# Patient Record
Sex: MALE | Race: OTHER | Employment: FULL TIME | URBAN - METROPOLITAN AREA
[De-identification: names, ages, dates, MRNs, and addresses within clinical notes are randomized per-mention and may not be internally consistent; named-entity substitution may affect disease eponyms.]

---

## 2021-09-28 ENCOUNTER — HOSPITAL ENCOUNTER (EMERGENCY)
Age: 41
Discharge: HOME | End: 2021-09-28
Attending: EMERGENCY MEDICINE

## 2021-09-28 VITALS
BODY MASS INDEX: 27.55 KG/M2 | HEIGHT: 65 IN | RESPIRATION RATE: 16 BRPM | SYSTOLIC BLOOD PRESSURE: 159 MMHG | WEIGHT: 165.34 LBS | OXYGEN SATURATION: 96 % | TEMPERATURE: 97.4 F | DIASTOLIC BLOOD PRESSURE: 95 MMHG | HEART RATE: 73 BPM

## 2021-09-28 DIAGNOSIS — L02.91 ABSCESS: Primary | ICD-10-CM

## 2021-09-28 PROCEDURE — 74011250637 HC RX REV CODE- 250/637: Performed by: NURSE PRACTITIONER

## 2021-09-28 PROCEDURE — 99283 EMERGENCY DEPT VISIT LOW MDM: CPT

## 2021-09-28 RX ORDER — SULFAMETHOXAZOLE AND TRIMETHOPRIM 800; 160 MG/1; MG/1
1 TABLET ORAL
Status: COMPLETED | OUTPATIENT
Start: 2021-09-28 | End: 2021-09-28

## 2021-09-28 RX ORDER — SULFAMETHOXAZOLE AND TRIMETHOPRIM 800; 160 MG/1; MG/1
1 TABLET ORAL 2 TIMES DAILY
Qty: 14 TABLET | Refills: 0 | Status: SHIPPED | OUTPATIENT
Start: 2021-09-28 | End: 2021-10-05

## 2021-09-28 RX ADMIN — SULFAMETHOXAZOLE AND TRIMETHOPRIM 1 TABLET: 800; 160 TABLET ORAL at 23:19

## 2021-09-29 NOTE — ED TRIAGE NOTES
Pt arrives from home with mask in place via POV. He was at work on Friday when he felt what he thought was an ant bite on lower abdomen. Today the area is abscessed, red and swollen.

## 2021-09-29 NOTE — ED PROVIDER NOTES
53-year-old male who presents emergency department today with complaint of wound to his right lower abdomen. Patient reports began last Friday. He states he thought he got bit by an aunt at work so he ignored it. He states that now he believes it might have been a spider. He reports the area has progressively gotten more red. He reports drainage from the area. Patient denies any fever, chills, nausea, vomiting, or diarrhea. He denies any treatment for his symptoms. The history is provided by the patient. A  was used. Abscess   This is a new problem. The current episode started more than 2 days ago. The problem has been gradually worsening. There has been no fever. The rash is present on the trunk. The pain is mild. The pain has been fluctuating since onset. Associated symptoms include pain. Pertinent negatives include no blisters, no itching, no weeping and no hives. He has tried nothing for the symptoms. History reviewed. No pertinent past medical history. History reviewed. No pertinent surgical history. History reviewed. No pertinent family history. Social History     Socioeconomic History    Marital status: Not on file     Spouse name: Not on file    Number of children: Not on file    Years of education: Not on file    Highest education level: Not on file   Occupational History    Not on file   Tobacco Use    Smoking status: Not on file   Substance and Sexual Activity    Alcohol use: Not on file    Drug use: Not on file    Sexual activity: Not on file   Other Topics Concern    Not on file   Social History Narrative    Not on file     Social Determinants of Health     Financial Resource Strain:     Difficulty of Paying Living Expenses:    Food Insecurity:     Worried About Running Out of Food in the Last Year:     [address] in the Last Year:    Transportation Needs:     Lack of Transportation (Medical):      Lack of Transportation (Non-Medical):    Physical Activity:     Days of Exercise per Week:     Minutes of Exercise per Session:    Stress:     Feeling of Stress :    Social Connections:     Frequency of Communication with Friends and Family:     Frequency of Social Gatherings with Friends and Family:     Attends Bahai Services:     Active Member of Clubs or Organizations:     Attends Club or Organization Meetings:     Marital Status:    Intimate Partner Violence:     Fear of Current or Ex-Partner:     Emotionally Abused:     Physically Abused:     Sexually Abused: ALLERGIES: Patient has no known allergies. Review of Systems   Constitutional: Negative for chills and fever. Gastrointestinal: Negative for diarrhea, nausea and vomiting. Skin: Positive for wound. Negative for itching. All other systems reviewed and are negative. Vitals:    09/28/21 2243   BP: (!) 159/95   Pulse: 73   Resp: 16   Temp: 97.4 °F (36.3 °C)   SpO2: 96%   Weight: 75 kg (165 lb 5.5 oz)   Height: 5' 5\" (1.651 m)            Physical Exam  Vitals and nursing note reviewed. Constitutional:       General: He is not in acute distress. Appearance: Normal appearance. He is normal weight. He is not ill-appearing, toxic-appearing or diaphoretic. HENT:      Head: Normocephalic and atraumatic. Right Ear: External ear normal.      Left Ear: External ear normal.      Mouth/Throat:      Mouth: Mucous membranes are moist.      Pharynx: Oropharynx is clear. Eyes:      General: No scleral icterus. Extraocular Movements: Extraocular movements intact. Conjunctiva/sclera: Conjunctivae normal.   Cardiovascular:      Rate and Rhythm: Normal rate. Pulses: Normal pulses. Pulmonary:      Effort: Pulmonary effort is normal. No respiratory distress. Abdominal:      General: Abdomen is flat. There is no distension. Musculoskeletal:         General: Normal range of motion.       Cervical back: Normal range of motion and neck supple. No rigidity. Right lower leg: No edema. Left lower leg: No edema. Skin:     General: Skin is warm and dry. Capillary Refill: Capillary refill takes less than 2 seconds. Findings: Abscess present. Comments: Abscess to right lower abdomen with scant purulent drainage. Surrounding erythema approximately 3 cm. No fluctuance or drainable area. Neurological:      General: No focal deficit present. Mental Status: He is alert and oriented to person, place, and time. Psychiatric:         Mood and Affect: Mood normal.         Behavior: Behavior normal.         Thought Content: Thought content normal.         Judgment: Judgment normal.          MDM  Number of Diagnoses or Management Options  Abscess: new and does not require workup  Diagnosis management comments: Overall well-appearing 78-year-old male who presents emergency department today with complaint of abscess to his right lower abdomen. Patient does have some mild purulent drainage on exam.  He also has surrounding erythema. No drainable areas or fluctuance noted on exam.  Patient states he does not have insurance. Will treat with Bactrim as he can get this for free at [facility]. First dose of Bactrim given in the emergency department today. Patient encouraged to return to the emergency department if he does not see any improvement in 2 to 3 days after being on antibiotic. Also to return for any fevers or worsening symptoms. I have discussed the results of all labs, procedures, radiographs, and/or treatments with the patient and available family members. Shante Elizabeth is agreed upon by the patient and the patient is ready for discharge.  Questions about treatment in the ED and differential diagnosis of presenting condition were answered. Gala Gosselin was given verbal discharge instructions including, but not limited to, importance of returning to the emergency department for any concern of worsening or continued symptoms.  Instructions were given to follow up with a primary care provider or specialist within 1-2 days. Zulma Menendez effects of medications, if prescribed, were discussed and patient was advised to refrain from significant physical activity until followed up by primary care physician and to not drive or operate heavy machinery after taking any sedating substances.      Angela Gerardo NP; 9/28/2021 @11:13 PM Voice dictation software was used during the making of  this note. This software is not perfect and grammatical and other typographical errors  may be present. This note has not been proofread for errors.       Risk of Complications, Morbidity, and/or Mortality  Presenting problems: low  Diagnostic procedures: low  Management options: low    Patient Progress  Patient progress: improved         Procedures

## 2021-09-29 NOTE — ED NOTES
I have reviewed discharge instructions with the patient. The patient verbalized understanding. Patient left ED via Discharge Method: ambulatory to Home with self. Opportunity for questions and clarification provided. Patient given 1 scripts. To continue your aftercare when you leave the hospital, you may receive an automated call from our care team to check in on how you are doing. This is a free service and part of our promise to provide the best care and service to meet your aftercare needs.  If you have questions, or wish to unsubscribe from this service please call [phone]. Thank you for Choosing our [facility] Emergency Department.

## 2021-09-29 NOTE — DISCHARGE INSTRUCTIONS
Take medication as prescribed. Antibiotic is free at [facility] pharmacy. Take over-the-counter ibuprofen or acetaminophen if needed for pain. Please return to the emergency department if no improvement after being on antibiotic for 2 to 3 days. Return to the emergency department for fever or any new, worsening, or concerning symptoms.